# Patient Record
Sex: FEMALE | Race: WHITE | NOT HISPANIC OR LATINO | Employment: FULL TIME | URBAN - METROPOLITAN AREA
[De-identification: names, ages, dates, MRNs, and addresses within clinical notes are randomized per-mention and may not be internally consistent; named-entity substitution may affect disease eponyms.]

---

## 2017-11-08 ENCOUNTER — GENERIC CONVERSION - ENCOUNTER (OUTPATIENT)
Dept: OTHER | Facility: OTHER | Age: 22
End: 2017-11-08

## 2017-11-08 ENCOUNTER — ALLSCRIPTS OFFICE VISIT (OUTPATIENT)
Dept: OTHER | Facility: OTHER | Age: 22
End: 2017-11-08
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
## 2017-11-12 LAB
ADEQUACY: (HISTORICAL): NORMAL
CHLAMYDIA DFA, NAA OR PCR (HISTORICAL): NEGATIVE
CLINICIAN PROVIDIED ICD 9 OR 10 (HISTORICAL): NORMAL
COMMENT (HISTORICAL): NORMAL
DIAGNOSIS (HISTORICAL): NORMAL
GC, DNA PROB (HISTORICAL): NEGATIVE
PERFORMED BY (HISTORICAL): NORMAL
REFLEX (HISTORICAL): NORMAL
TEST INFORMATION (HISTORICAL): NORMAL

## 2017-11-13 ENCOUNTER — GENERIC CONVERSION - ENCOUNTER (OUTPATIENT)
Dept: OTHER | Facility: OTHER | Age: 22
End: 2017-11-13

## 2018-01-13 VITALS
BODY MASS INDEX: 19.68 KG/M2 | WEIGHT: 122.44 LBS | DIASTOLIC BLOOD PRESSURE: 68 MMHG | HEIGHT: 66 IN | SYSTOLIC BLOOD PRESSURE: 120 MMHG

## 2018-07-22 DIAGNOSIS — Z30.41 SURVEILLANCE FOR BIRTH CONTROL, ORAL CONTRACEPTIVES: Primary | ICD-10-CM

## 2019-01-03 ENCOUNTER — ANNUAL EXAM (OUTPATIENT)
Dept: OBGYN CLINIC | Facility: MEDICAL CENTER | Age: 24
End: 2019-01-03
Payer: COMMERCIAL

## 2019-01-03 VITALS
SYSTOLIC BLOOD PRESSURE: 110 MMHG | WEIGHT: 130 LBS | BODY MASS INDEX: 20.89 KG/M2 | DIASTOLIC BLOOD PRESSURE: 64 MMHG | HEIGHT: 66 IN

## 2019-01-03 DIAGNOSIS — Z01.419 ENCOUNTER FOR GYNECOLOGICAL EXAMINATION WITH PAPANICOLAOU SMEAR OF CERVIX: ICD-10-CM

## 2019-01-03 DIAGNOSIS — Z11.3 SCREENING EXAMINATION FOR STD (SEXUALLY TRANSMITTED DISEASE): ICD-10-CM

## 2019-01-03 DIAGNOSIS — Z30.011 ENCOUNTER FOR BCP (BIRTH CONTROL PILLS) INITIAL PRESCRIPTION: Primary | ICD-10-CM

## 2019-01-03 PROCEDURE — 87491 CHLMYD TRACH DNA AMP PROBE: CPT | Performed by: NURSE PRACTITIONER

## 2019-01-03 PROCEDURE — 87591 N.GONORRHOEAE DNA AMP PROB: CPT | Performed by: NURSE PRACTITIONER

## 2019-01-03 PROCEDURE — S0612 ANNUAL GYNECOLOGICAL EXAMINA: HCPCS | Performed by: NURSE PRACTITIONER

## 2019-01-03 PROCEDURE — G0124 SCREEN C/V THIN LAYER BY MD: HCPCS | Performed by: PATHOLOGY

## 2019-01-03 PROCEDURE — G0145 SCR C/V CYTO,THINLAYER,RESCR: HCPCS | Performed by: PATHOLOGY

## 2019-01-03 RX ORDER — NORETHINDRONE ACETATE AND ETHINYL ESTRADIOL 1; .02 MG/1; MG/1
1 TABLET ORAL DAILY
Qty: 84 TABLET | Refills: 3 | Status: SHIPPED | OUTPATIENT
Start: 2019-01-03 | End: 2021-01-11 | Stop reason: ALTCHOICE

## 2019-01-03 NOTE — PROGRESS NOTES
ASSESSMENT & PLAN: Becky Hein is a 21 y o  Arshad Saravia with normal gynecologic exam     1   Routine well woman exam done today  2  Pap and HPV:  The patient's last pap was   It was normal     Pap was done today  Current ASCCP Guidelines reviewed  3   STD testing  was done   4  Gardasil recommendations reviewed  is not fully  Vaccinated  Her mother does not want her to get the vaccine anymore  Pt aware she can get it up until age 32    11  The following were reviewed in today's visit: breast self exam, use and side effects of OCPs, adequate intake of calcium and vitamin D, exercise and healthy diet  6  rx for junel 1/20 sent to pharmacy for the year  CC:  Annual Gynecologic Examination    HPI: Becky Hein is a 21 y o  Arshad Saravia who presents for annual gynecologic examination  She has the following concerns:  Wants to change ocp, feels more cramping on sprintec and if doesn't take at exactly the same time, she gets bleeding  States mother recently diagnosed with cervical cancer  Health Maintenance:    She wears her seatbelt routinely  She does not perform regular monthly self breast exams  She feels safe at home  History reviewed  No pertinent past medical history  History reviewed  No pertinent surgical history  OB/Gyn History:    Pt does not have menstrual issues  History of sexually transmitted infection: No   History of abnormal pap smears: No      Patient is currently sexually active  The current method of family planning is OCP (estrogen/progesterone)  OB History      Para Term  AB Living    0 0 0 0 0 0    SAB TAB Ectopic Multiple Live Births    0 0 0 0 0          Family History   Problem Relation Age of Onset    Cervical cancer Mother        Social History:  Social History     Social History    Marital status: Single     Spouse name: N/A    Number of children: N/A    Years of education: N/A     Occupational History    Not on file       Social History Main Topics    Smoking status: Never Smoker    Smokeless tobacco: Never Used    Alcohol use Yes      Comment: social    Drug use: No    Sexual activity: Yes     Partners: Male     Birth control/ protection: Pill     Other Topics Concern    Not on file     Social History Narrative    No narrative on file     Patient is single  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 [place]  Boyfriend plays for the phant[employer]  No Known Allergies      Current Outpatient Prescriptions:     SPRINTEC 28 0 25-35 MG-MCG per tablet, TAKE ONE TABLET DAILY AS DIRECTED, Disp: 84 tablet, Rfl: 1    Review of Systems:  Constitutional :no fever, feels well, no tiredness, no recent weight gain or loss  ENT: no ear ache, no loss of hearing, no nosebleeds or nasal discharge, no sore throat or hoarseness  Cardiovascular: no complaints of slow or fast heart beat, no chest pain, no palpitations, no leg claudication or lower extremity edema  Respiratory: no complaints of shortness of shortness of breath, no TELLO  Breasts:no complaints of breast pain, breast lump, or nipple discharge  Gastrointestinal: no complaints of abdominal pain, constipation, nausea, vomiting, or diarrhea or bloody stools  Genitourinary : no complaints of dysuria, incontinence, pelvic pain, no dysmenorrhea, vaginal discharge or abnormal vaginal bleeding and as noted in HPI  Musculoskeletal: no complaints of arthralgia, no myalgia, no joint swelling or stiffness, no limb pain or swelling    Integumentary: no complaints of skin rash or lesion, itching or dry skin  Neurological: no complaints of headache, no confusion, no numbness or tingling, no dizziness or fainting    Objective      /64   Ht 5' 5 5" (1 664 m)   Wt 59 kg (130 lb)   LMP 12/09/2018   BMI 21 30 kg/m²     General:   appears stated age, cooperative, alert normal mood and affect   Neck: normal, supple,trachea midline, no masses   Heart: regular rate and rhythm, S1, S2 normal, no murmur, click, rub or gallop   Lungs: clear to auscultation bilaterally   Breasts: normal appearance, no masses or tenderness   Abdomen: soft, non-tender, without masses or organomegaly   Vulva: normal female genitalia   Vagina: normal vagina   Urethra: normal   Cervix: Normal, no discharge  Uterus: normal size, contour, position, consistency, mobility, non-tender   Adnexa: normal adnexa   Lymphatic palpation of lymph nodes in neck, axilla, groin and/or other locations: no lymphadenopathy or masses noted   Skin normal skin turgor and no rashes     Psychiatric orientation to person, place, and time: normal  mood and affect: normal

## 2019-01-04 LAB
C TRACH DNA SPEC QL NAA+PROBE: NEGATIVE
N GONORRHOEA DNA SPEC QL NAA+PROBE: NEGATIVE

## 2019-01-08 LAB
LAB AP GYN PRIMARY INTERPRETATION: NORMAL
LAB AP LMP: NORMAL
Lab: NORMAL
PATH INTERP SPEC-IMP: NORMAL

## 2019-01-10 DIAGNOSIS — Z30.41 SURVEILLANCE FOR BIRTH CONTROL, ORAL CONTRACEPTIVES: ICD-10-CM

## 2019-12-26 ENCOUNTER — OFFICE VISIT (OUTPATIENT)
Dept: URGENT CARE | Facility: MEDICAL CENTER | Age: 24
End: 2019-12-26
Payer: COMMERCIAL

## 2019-12-26 VITALS
DIASTOLIC BLOOD PRESSURE: 72 MMHG | TEMPERATURE: 100.3 F | RESPIRATION RATE: 18 BRPM | HEIGHT: 66 IN | HEART RATE: 96 BPM | SYSTOLIC BLOOD PRESSURE: 122 MMHG | WEIGHT: 137.13 LBS | BODY MASS INDEX: 22.04 KG/M2 | OXYGEN SATURATION: 98 %

## 2019-12-26 DIAGNOSIS — B96.89 ACUTE BACTERIAL SINUSITIS: Primary | ICD-10-CM

## 2019-12-26 DIAGNOSIS — J01.90 ACUTE BACTERIAL SINUSITIS: Primary | ICD-10-CM

## 2019-12-26 PROCEDURE — 99213 OFFICE O/P EST LOW 20 MIN: CPT | Performed by: PHYSICIAN ASSISTANT

## 2019-12-26 RX ORDER — BROMPHENIRAMINE MALEATE, PSEUDOEPHEDRINE HYDROCHLORIDE, AND DEXTROMETHORPHAN HYDROBROMIDE 2; 30; 10 MG/5ML; MG/5ML; MG/5ML
10 SYRUP ORAL 3 TIMES DAILY PRN
Qty: 120 ML | Refills: 0 | Status: SHIPPED | OUTPATIENT
Start: 2019-12-26 | End: 2020-01-06 | Stop reason: ALTCHOICE

## 2019-12-26 RX ORDER — AMOXICILLIN 875 MG/1
875 TABLET, COATED ORAL 2 TIMES DAILY
Qty: 20 TABLET | Refills: 0 | Status: SHIPPED | OUTPATIENT
Start: 2019-12-26 | End: 2020-01-05

## 2019-12-26 RX ORDER — FLUTICASONE PROPIONATE 50 MCG
2 SPRAY, SUSPENSION (ML) NASAL DAILY
Qty: 16 G | Refills: 0 | Status: SHIPPED | OUTPATIENT
Start: 2019-12-26 | End: 2020-01-06 | Stop reason: ALTCHOICE

## 2019-12-26 NOTE — PROGRESS NOTES
[facility]        NAME: Dana Negron is a 25 y o  female  : 1995    MRN: [id number]  DATE: 2019  TIME: 11:54 AM    Assessment and Plan   Acute bacterial sinusitis [J01 90, B96 89]  1  Acute bacterial sinusitis  amoxicillin (AMOXIL) 875 mg tablet    fluticasone (FLONASE) 50 mcg/act nasal spray    brompheniramine-pseudoephedrine-DM 30-2-10 MG/5ML syrup         Patient Instructions       Follow up with PCP in 3-5 days  Proceed to  ER if symptoms worsen  Chief Complaint     Chief Complaint   Patient presents with    Cold Like Symptoms     c/o sinus congestion, fever of 103 at home, body aches since Saturday night  [facility]ent to [facility] on , no rxs given  History of Present Illness       Patient had been trying symptomatic relief over the last 10 days without improvement  Sinusitis   This is a recurrent problem  The current episode started 1 to 4 weeks ago  The problem has been gradually worsening since onset  The maximum temperature recorded prior to her arrival was 100 4 - 100 9 F  The pain is mild  Associated symptoms include chills, congestion, coughing, ear pain, headaches, sinus pressure, sneezing and a sore throat  Pertinent negatives include no diaphoresis, hoarse voice, neck pain, shortness of breath or swollen glands  Past treatments include acetaminophen and oral decongestants  The treatment provided no relief  Review of Systems   Review of Systems   Constitutional: Positive for chills  Negative for diaphoresis  HENT: Positive for congestion, ear pain, sinus pressure, sneezing and sore throat  Negative for hoarse voice  Respiratory: Positive for cough  Negative for shortness of breath  Musculoskeletal: Negative for neck pain  Neurological: Positive for headaches  All other systems reviewed and are negative          Current Medications       Current Outpatient Medications:     amoxicillin (AMOXIL) 875 mg tablet, Take 1 tablet (875 mg total) by mouth 2 (two) times a day for 10 days, Disp: 20 tablet, Rfl: 0    brompheniramine-pseudoephedrine-DM 30-2-10 MG/5ML syrup, Take 10 mL by mouth 3 (three) times a day as needed for congestion, Disp: 120 mL, Rfl: 0    fluticasone (FLONASE) 50 mcg/act nasal spray, 2 sprays into each nostril daily, Disp: 16 g, Rfl: 0    norethindrone-ethinyl estradiol (MICROGESTIN 1/20) 1-20 MG-MCG per tablet, Take 1 tablet by mouth daily for 84 days, Disp: 84 tablet, Rfl: 3    SPRINTEC 28 0 25-35 MG-MCG per tablet, TAKE ONE TABLET DAILY AS DIRECTED (Patient not taking: Reported on 12/26/2019), Disp: 84 tablet, Rfl: 1    Current Allergies     Allergies as of 12/26/2019    (No Known Allergies)            The following portions of the patient's history were reviewed and updated as appropriate: allergies, current medications, past family history, past medical history, past social history, past surgical history and problem list      History reviewed  No pertinent past medical history  History reviewed  No pertinent surgical history  Family History   Problem Relation Age of Onset    Cervical cancer Mother          Medications have been verified  Objective   /72 (BP Location: Left arm, Patient Position: Sitting, Cuff Size: Adult)   Pulse 96   Temp 100 3 °F (37 9 °C) (Tympanic)   Resp 18   Ht 5' 6" (1 676 m)   Wt 62 2 kg (137 lb 2 oz)   LMP 12/21/2019   SpO2 98%   BMI 22 13 kg/m²        Physical Exam     Physical Exam   Constitutional: She appears well-developed and well-nourished  HENT:   Right Ear: Hearing, external ear and ear canal normal  Tympanic membrane is bulging  Left Ear: Hearing, external ear and ear canal normal  Tympanic membrane is bulging  Cardiovascular: Normal rate, regular rhythm and normal heart sounds  Pulmonary/Chest: Effort normal and breath sounds normal    Nursing note and vitals reviewed  Positive maxillary tenderness  Left greater than right    Poor transillumination on the left

## 2019-12-26 NOTE — PATIENT INSTRUCTIONS

## 2020-01-06 ENCOUNTER — ANNUAL EXAM (OUTPATIENT)
Dept: OBGYN CLINIC | Facility: MEDICAL CENTER | Age: 25
End: 2020-01-06
Payer: COMMERCIAL

## 2020-01-06 VITALS
BODY MASS INDEX: 22.34 KG/M2 | WEIGHT: 139 LBS | DIASTOLIC BLOOD PRESSURE: 74 MMHG | HEIGHT: 66 IN | SYSTOLIC BLOOD PRESSURE: 110 MMHG

## 2020-01-06 DIAGNOSIS — Z01.419 ENCOUNTER FOR GYNECOLOGICAL EXAMINATION WITH PAPANICOLAOU SMEAR OF CERVIX: Primary | ICD-10-CM

## 2020-01-06 PROCEDURE — S0612 ANNUAL GYNECOLOGICAL EXAMINA: HCPCS | Performed by: NURSE PRACTITIONER

## 2020-01-06 NOTE — PROGRESS NOTES
ASSESSMENT & PLAN: Elma Rosen is a 25 y o  Chiqui Chang with normal gynecologic exam     1   Routine well woman exam done today  2  Pap:  The patient's last pap was 2019  It was normal     Pap was done today  Current ASCCP Guidelines reviewed  3   STD testing  was not done   4  Gardasil recommendations reviewed  is not vaccinated  She declines  5  The following were reviewed in today's visit: breast self exam, family planning choices, adequate intake of calcium and vitamin D, exercise and healthy diet  6  Pt wants to continue with withdrawal as bcm, aware of ECP if needed  rto one year annual      CC:  Annual Gynecologic Examination    HPI: Elma Rosen is a 25 y o  Chiqui Chang who presents for annual gynecologic examination  She has the following concerns: none  Stopped ocp bc of migraines, feels much better off of it  Would be ok if conceived  Health Maintenance:    She wears her seatbelt routinely  She does perform regular monthly self breast exams  She feels safe at home  History reviewed  No pertinent past medical history  History reviewed  No pertinent surgical history  OB/Gyn History:    Pt does not have menstrual issues  History of sexually transmitted infection: No   History of abnormal pap smears: Yes   Patient is currently sexually active  The current method of family planning is coitus interruptus      OB History        0    Para   0    Term   0       0    AB   0    Living   0       SAB   0    TAB   0    Ectopic   0    Multiple   0    Live Births   0                 Family History   Problem Relation Age of Onset    Cervical cancer Mother        Social History:  Social History     Socioeconomic History    Marital status: Single     Spouse name: Not on file    Number of children: Not on file    Years of education: Not on file    Highest education level: Not on file   Occupational History    Not on file   Social Needs    Financial resource strain: Not on file    Food insecurity:     Worry: Not on file     Inability: Not on file    Transportation needs:     Medical: Not on file     Non-medical: Not on file   Tobacco Use    Smoking status: Never Smoker    Smokeless tobacco: Never Used   Substance and Sexual Activity    Alcohol use: Yes     Frequency: Monthly or less     Comment: social    Drug use: No    Sexual activity: Yes     Partners: Male   Lifestyle    Physical activity:     Days per week: Not on file     Minutes per session: Not on file    Stress: Not on file   Relationships    Social connections:     Talks on phone: Not on file     Gets together: Not on file     Attends [employer]ism service: Not on file     Active member of club or organization: Not on file     Attends meetings of clubs or organizations: Not on file     Relationship status: Not on file    Intimate partner violence:     Fear of current or ex partner: Not on file     Emotionally abused: Not on file     Physically abused: Not on file     Forced sexual activity: Not on file   Other Topics Concern    Not on file   Social History Narrative    Not on file     Patient is single  Patient is currently employed     No Known Allergies      Current Outpatient Medications:     norethindrone-ethinyl estradiol (MICROGESTIN 1/20) 1-20 MG-MCG per tablet, Take 1 tablet by mouth daily for 84 days, Disp: 84 tablet, Rfl: 3    Review of Systems:  Constitutional :no fever, feels well, no tiredness, no recent weight gain or loss  ENT: no ear ache, no loss of hearing, no nosebleeds or nasal discharge, no sore throat or hoarseness  Cardiovascular: no complaints of slow or fast heart beat, no chest pain, no palpitations, no leg claudication or lower extremity edema    Respiratory: no complaints of shortness of shortness of breath, no TELLO  Breasts:no complaints of breast pain, breast lump, or nipple discharge  Gastrointestinal: no complaints of abdominal pain, constipation, nausea, vomiting, or diarrhea or bloody stools  Genitourinary : no complaints of dysuria, incontinence, pelvic pain, no dysmenorrhea, vaginal discharge or abnormal vaginal bleeding and as noted in HPI  Musculoskeletal: no complaints of arthralgia, no myalgia, no joint swelling or stiffness, no limb pain or swelling  Integumentary: no complaints of skin rash or lesion, itching or dry skin  Neurological: no complaints of headache, no confusion, no numbness or tingling, no dizziness or fainting    Objective      /74   Ht 5' 6" (1 676 m)   Wt 63 kg (139 lb)   LMP 12/18/2019   BMI 22 44 kg/m²     General:   appears stated age, cooperative, alert normal mood and affect   Neck: normal, supple,trachea midline, no masses   Heart: regular rate and rhythm, S1, S2 normal, no murmur, click, rub or gallop   Lungs: clear to auscultation bilaterally   Breasts: normal appearance, no masses or tenderness   Abdomen: soft, non-tender, without masses or organomegaly   Vulva: normal   Vagina: normal vagina   Urethra: normal   Cervix: Normal, no discharge  Uterus: normal size, contour, position, consistency, mobility, non-tender   Adnexa: no mass, fullness, tenderness   Lymphatic palpation of lymph nodes in neck, axilla, groin and/or other locations: no lymphadenopathy or masses noted   Skin normal skin turgor and no rashes     Psychiatric orientation to person, place, and time: normal  mood and affect: normal

## 2020-01-09 LAB
CLINICAL INFO: ABNORMAL
CYTOLOGY CMNT CVX/VAG CYTO-IMP: ABNORMAL
DATE PREVIOUS BX: ABNORMAL
GEN CATEG CVX/VAG CYTO-IMP: ABNORMAL
HPV E6+E7 MRNA CVX QL NAA+PROBE: DETECTED
LMP START DATE: ABNORMAL
SL AMB PREV. PAP:: ABNORMAL
SPECIMEN SOURCE CVX/VAG CYTO: ABNORMAL

## 2020-01-13 ENCOUNTER — TELEPHONE (OUTPATIENT)
Dept: OBGYN CLINIC | Facility: MEDICAL CENTER | Age: 25
End: 2020-01-13

## 2020-01-13 NOTE — RESULT ENCOUNTER NOTE
Please call the patient regarding abnormal result      Has AGC pap with + hpv,           Needs colpo

## 2020-02-12 ENCOUNTER — PROCEDURE VISIT (OUTPATIENT)
Dept: OBGYN CLINIC | Facility: MEDICAL CENTER | Age: 25
End: 2020-02-12
Payer: COMMERCIAL

## 2020-02-12 VITALS — SYSTOLIC BLOOD PRESSURE: 120 MMHG | BODY MASS INDEX: 22.73 KG/M2 | WEIGHT: 140.8 LBS | DIASTOLIC BLOOD PRESSURE: 70 MMHG

## 2020-02-12 DIAGNOSIS — R87.610 ASCUS WITH POSITIVE HIGH RISK HPV CERVICAL: Primary | ICD-10-CM

## 2020-02-12 DIAGNOSIS — R87.810 ASCUS WITH POSITIVE HIGH RISK HPV CERVICAL: Primary | ICD-10-CM

## 2020-02-12 PROCEDURE — 57455 BIOPSY OF CERVIX W/SCOPE: CPT | Performed by: OBSTETRICS & GYNECOLOGY

## 2020-02-14 LAB — SPECIMEN SOURCE: NORMAL

## 2020-11-24 ENCOUNTER — NURSE TRIAGE (OUTPATIENT)
Dept: OTHER | Facility: OTHER | Age: 25
End: 2020-11-24

## 2020-11-24 DIAGNOSIS — Z20.828 EXPOSURE TO SARS-ASSOCIATED CORONAVIRUS: Primary | ICD-10-CM

## 2020-11-25 DIAGNOSIS — Z20.828 EXPOSURE TO SARS-ASSOCIATED CORONAVIRUS: ICD-10-CM

## 2020-11-25 PROCEDURE — U0003 INFECTIOUS AGENT DETECTION BY NUCLEIC ACID (DNA OR RNA); SEVERE ACUTE RESPIRATORY SYNDROME CORONAVIRUS 2 (SARS-COV-2) (CORONAVIRUS DISEASE [COVID-19]), AMPLIFIED PROBE TECHNIQUE, MAKING USE OF HIGH THROUGHPUT TECHNOLOGIES AS DESCRIBED BY CMS-2020-01-R: HCPCS | Performed by: FAMILY MEDICINE

## 2020-11-27 LAB — SARS-COV-2 RNA SPEC QL NAA+PROBE: DETECTED

## 2021-01-11 ENCOUNTER — ANNUAL EXAM (OUTPATIENT)
Dept: OBGYN CLINIC | Facility: MEDICAL CENTER | Age: 26
End: 2021-01-11
Payer: COMMERCIAL

## 2021-01-11 VITALS
BODY MASS INDEX: 21.69 KG/M2 | WEIGHT: 135 LBS | HEIGHT: 66 IN | SYSTOLIC BLOOD PRESSURE: 120 MMHG | DIASTOLIC BLOOD PRESSURE: 70 MMHG

## 2021-01-11 DIAGNOSIS — Z01.419 ENCOUNTER FOR GYNECOLOGICAL EXAMINATION WITH PAPANICOLAOU SMEAR OF CERVIX: Primary | ICD-10-CM

## 2021-01-11 PROCEDURE — 87624 HPV HI-RISK TYP POOLED RSLT: CPT | Performed by: NURSE PRACTITIONER

## 2021-01-11 PROCEDURE — S0612 ANNUAL GYNECOLOGICAL EXAMINA: HCPCS | Performed by: NURSE PRACTITIONER

## 2021-01-11 PROCEDURE — G0145 SCR C/V CYTO,THINLAYER,RESCR: HCPCS | Performed by: NURSE PRACTITIONER

## 2021-01-11 NOTE — PROGRESS NOTES
ASSESSMENT & PLAN: Elma Rosen is a 22 y o  Chiqui Chang with normal gynecologic exam     1   Routine well woman exam done today  2  Pap:  The patient's last pap was 2019  It was abnormal     Pap was done today  Current ASCCP Guidelines reviewed  3   STD testing  was not done   4  Gardasil recommendations reviewed   5  The following were reviewed in today's visit: breast self exam, family planning choices, adequate intake of calcium and vitamin D, exercise and healthy diet  6  rto yearly gyn exam     CC:  Annual Gynecologic Examination    HPI: Elma Rosen is a 22 y o  Chiqui Chang who presents for annual gynecologic examination  She has the following concerns: none    Health Maintenance:    She wears her seatbelt routinely  She does perform regular monthly self breast exams  She feels safe at home  History reviewed  No pertinent past medical history  History reviewed  No pertinent surgical history  OB/Gyn History:    Pt does not have menstrual issues  History of sexually transmitted infection: No   History of abnormal pap smears:yes  Patient is currently sexually active      The current method of family planning is condoms       OB History        0    Para   0    Term   0       0    AB   0    Living   0       SAB   0    TAB   0    Ectopic   0    Multiple   0    Live Births   0                 Family History   Problem Relation Age of Onset    Cervical cancer Mother        Social History:  Social History     Socioeconomic History    Marital status: Single     Spouse name: Not on file    Number of children: Not on file    Years of education: Not on file    Highest education level: Not on file   Occupational History    Not on file   Social Needs    Financial resource strain: Not on file    Food insecurity     Worry: Not on file     Inability: Not on file    Transportation needs     Medical: Not on file     Non-medical: Not on file   Tobacco Use    Smoking status: Never Smoker    Smokeless tobacco: Never Used   Substance and Sexual Activity    Alcohol use: Yes     Frequency: Monthly or less     Comment: social    Drug use: No    Sexual activity: Yes     Partners: Male     Birth control/protection: None   Lifestyle    Physical activity     Days per week: Not on file     Minutes per session: Not on file    Stress: Not on file   Relationships    Social connections     Talks on phone: Not on file     Gets together: Not on file     Attends Hinduism service: Not on file     Active member of club or organization: Not on file     Attends meetings of clubs or organizations: Not on file     Relationship status: Not on file    Intimate partner violence     Fear of current or ex partner: Not on file     Emotionally abused: Not on file     Physically abused: Not on file     Forced sexual activity: Not on file   Other Topics Concern    Not on file   Social History Narrative    Not on file     Patient is single  Patient is currently employed as a kindergarden teacher  No Known Allergies    No current outpatient medications on file  Review of Systems:  Constitutional :no fever, feels well, no tiredness, no recent weight gain or loss  ENT: no ear ache, no loss of hearing, no nosebleeds or nasal discharge, no sore throat or hoarseness  Cardiovascular: no complaints of slow or fast heart beat, no chest pain, no palpitations, no leg claudication or lower extremity edema  Respiratory: no complaints of shortness of shortness of breath, no TELLO  Breasts:no complaints of breast pain, breast lump, or nipple discharge  Gastrointestinal: no complaints of abdominal pain, constipation, nausea, vomiting, or diarrhea or bloody stools  Genitourinary : no complaints of dysuria, incontinence, pelvic pain, no dysmenorrhea, vaginal discharge or abnormal vaginal bleeding and as noted in HPI    Musculoskeletal: no complaints of arthralgia, no myalgia, no joint swelling or stiffness, no limb pain or swelling  Integumentary: no complaints of skin rash or lesion, itching or dry skin  Neurological: no complaints of headache, no confusion, no numbness or tingling, no dizziness or fainting    Objective      /70   Ht 5' 6" (1 676 m)   Wt 61 2 kg (135 lb)   LMP 12/14/2020   BMI 21 79 kg/m²     General:   appears stated age, cooperative, alert normal mood and affect   Neck: normal, supple,trachea midline, no masses   Heart: regular rate and rhythm, S1, S2 normal, no murmur, click, rub or gallop   Lungs: clear to auscultation bilaterally   Breasts: normal appearance, no masses or tenderness   Abdomen: soft, non-tender, without masses or organomegaly   Vulva: normal   Vagina: normal vagina   Urethra: normal   Cervix: Normal, no discharge  Uterus: normal size, contour, position, consistency, mobility, non-tender   Adnexa: no mass, fullness, tenderness   Lymphatic palpation of lymph nodes in neck, axilla, groin and/or other locations: no lymphadenopathy or masses noted   Skin normal skin turgor and no rashes     Psychiatric orientation to person, place, and time: normal  mood and affect: normal

## 2021-01-13 LAB
LAB AP GYN PRIMARY INTERPRETATION: NORMAL
LAB AP LMP: NORMAL
Lab: NORMAL

## 2021-01-16 LAB
HPV HR 12 DNA CVX QL NAA+PROBE: POSITIVE
HPV16 DNA CVX QL NAA+PROBE: NEGATIVE
HPV18 DNA CVX QL NAA+PROBE: NEGATIVE

## 2022-01-24 ENCOUNTER — ANNUAL EXAM (OUTPATIENT)
Dept: OBGYN CLINIC | Facility: MEDICAL CENTER | Age: 27
End: 2022-01-24
Payer: COMMERCIAL

## 2022-01-24 VITALS
DIASTOLIC BLOOD PRESSURE: 80 MMHG | SYSTOLIC BLOOD PRESSURE: 120 MMHG | HEIGHT: 66 IN | WEIGHT: 135 LBS | BODY MASS INDEX: 21.69 KG/M2

## 2022-01-24 DIAGNOSIS — R87.810 ASCUS WITH POSITIVE HIGH RISK HPV CERVICAL: ICD-10-CM

## 2022-01-24 DIAGNOSIS — Z01.419 WOMEN'S ANNUAL ROUTINE GYNECOLOGICAL EXAMINATION: Primary | ICD-10-CM

## 2022-01-24 DIAGNOSIS — R87.610 ASCUS WITH POSITIVE HIGH RISK HPV CERVICAL: ICD-10-CM

## 2022-01-24 PROCEDURE — G0145 SCR C/V CYTO,THINLAYER,RESCR: HCPCS | Performed by: STUDENT IN AN ORGANIZED HEALTH CARE EDUCATION/TRAINING PROGRAM

## 2022-01-24 PROCEDURE — S0612 ANNUAL GYNECOLOGICAL EXAMINA: HCPCS | Performed by: STUDENT IN AN ORGANIZED HEALTH CARE EDUCATION/TRAINING PROGRAM

## 2022-01-24 NOTE — PROGRESS NOTES
[facility] [address] [place] [address]    ASSESSMENT/PLAN: Leopold Care is a 32 y o  Dellis Loft who presents for annual gynecologic exam     Encounter for routine gynecologic examination  - Routine well woman exam completed today  - Cervical Cancer Screening: Current ASCCP Guidelines reviewed  HPV OHR pos last year  01/24/2022   - HPV Vaccination status: Immunization series complete  - STI screening offered including HIV testing: Declines  - Contraceptive counseling discussed  Declines  Additional problems addressed during this visit:  1  Women's annual routine gynecological examination  -     Liquid-based pap, screening    2  ASCUS with positive high risk HPV cervical  -     Liquid-based pap, screening        CC: Annual Gynecologic Examination    HPI: Leopold Care is a 32 y o  Dellis Loft who presents for annual gynecologic examination  She reports no new changes to her health  She reports no breast concerns  She gets irregular periods every 28-35 days  She has no vaginal discharge, vulvar or vaginal lesions, pelvic pain, or abnormal bleeding  She has no sexual health concerns and is currently sexually active with one male partner  She contracepts with withdrawal  She has no symptoms of pelvic organ prolapse, urinary, or fecal incontinence  She denies intimate partner violence  The following portions of the patient's history were reviewed and updated as appropriate: allergies, current medications, past family history, past medical history, obstetric history, gynecologic history, past social history, past surgical history and problem list     Review of Systems   Constitutional: Negative for chills and fever  Respiratory: Negative for cough and shortness of breath  Cardiovascular: Negative for chest pain and leg swelling  Gastrointestinal: Negative for abdominal pain, nausea and vomiting  Genitourinary: Negative for dysuria, frequency and urgency     Neurological: Negative for dizziness, light-headedness and headaches  Objective:  /80   Ht 5' 6" (1 676 m)   Wt 61 2 kg (135 lb)   LMP 12/27/2021   BMI 21 79 kg/m²    Physical Exam  Exam conducted with a chaperone present  Constitutional:       Appearance: Normal appearance  HENT:      Head: Normocephalic and atraumatic  Cardiovascular:      Rate and Rhythm: Normal rate  Pulmonary:      Effort: Pulmonary effort is normal    Chest:   Breasts: Breasts are symmetrical       Right: Normal  No swelling, bleeding, inverted nipple, mass, nipple discharge, skin change, tenderness or axillary adenopathy  Left: Normal  No swelling, bleeding, inverted nipple, mass, nipple discharge, skin change, tenderness or axillary adenopathy  Abdominal:      General: There is no distension  Tenderness: There is no abdominal tenderness  There is no guarding  Genitourinary:     Exam position: Lithotomy position  Pubic Area: No rash  Labia:         Right: No rash, tenderness or lesion  Left: No rash, tenderness or lesion  Urethra: No prolapse, urethral swelling or urethral lesion  Vagina: Normal  No vaginal discharge, erythema, tenderness, bleeding or lesions  Cervix: No cervical motion tenderness, discharge, friability or erythema  Uterus: Not enlarged, not tender and no uterine prolapse  Adnexa:         Right: No mass, tenderness or fullness  Left: No mass, tenderness or fullness  Lymphadenopathy:      Upper Body:      Right upper body: No axillary adenopathy  Left upper body: No axillary adenopathy  Lower Body: No right inguinal adenopathy  No left inguinal adenopathy  Neurological:      Mental Status: She is alert             Susannah Ward MD  [address] [place] [address]  1/24/2022 6:43 PM

## 2022-01-29 LAB
LAB AP GYN PRIMARY INTERPRETATION: NORMAL
Lab: NORMAL

## 2023-02-16 ENCOUNTER — OFFICE VISIT (OUTPATIENT)
Dept: GASTROENTEROLOGY | Facility: MEDICAL CENTER | Age: 28
End: 2023-02-16

## 2023-02-16 VITALS
HEART RATE: 76 BPM | SYSTOLIC BLOOD PRESSURE: 126 MMHG | BODY MASS INDEX: 21.31 KG/M2 | TEMPERATURE: 97.6 F | WEIGHT: 132 LBS | DIASTOLIC BLOOD PRESSURE: 72 MMHG

## 2023-02-16 DIAGNOSIS — N93.9 VAGINAL BLEEDING: ICD-10-CM

## 2023-02-16 DIAGNOSIS — R14.0 BLOATING: ICD-10-CM

## 2023-02-16 DIAGNOSIS — R19.7 DIARRHEA, UNSPECIFIED TYPE: ICD-10-CM

## 2023-02-16 DIAGNOSIS — Z83.79 FAMILY HISTORY OF CROHN'S DISEASE: ICD-10-CM

## 2023-02-16 DIAGNOSIS — R19.4 FREQUENT BOWEL MOVEMENTS: ICD-10-CM

## 2023-02-16 DIAGNOSIS — K62.5 RECTAL BLEEDING: ICD-10-CM

## 2023-02-16 DIAGNOSIS — R10.30 LOWER ABDOMINAL PAIN: Primary | ICD-10-CM

## 2023-02-16 RX ORDER — DICYCLOMINE HYDROCHLORIDE 10 MG/1
10 CAPSULE ORAL 4 TIMES DAILY PRN
Qty: 120 CAPSULE | Refills: 11 | Status: SHIPPED | OUTPATIENT
Start: 2023-02-16

## 2023-02-16 NOTE — PROGRESS NOTES
[facility] - Outpatient Consultation  Sam Cavazos 32 y o  female MRN: [id number]  Encounter: [id number]          ASSESSMENT AND PLAN:    31 yo healthy female here for 5 months of increased bowel movements, diarrhea, abdominal   Reports nocturnal BM and rectal bleeding concerning for IBD  Will check for Celiac, hyperthyroid and IBD  Will try bentyl for possible IBS-D component  1  Lower abdominal pain  2  Diarrhea, unspecified type  3  Frequent bowel movements  4  Rectal bleeding  - IgA; Future  - Tissue transglutaminase, IgA; Future  - Calprotectin,Fecal; Future  - TSH, 3rd generation; Future  - Colonoscopy; Future  - dicyclomine (BENTYL) 10 mg capsule; Take 1 capsule (10 mg total) by mouth 4 (four) times a day as needed (abdominal pain and diarrhea)  Dispense: 120 capsule; Refill: 11    5  Bloating  If TTG positive or calprotectin elevated, may need EGD at time of colonoscopy  - IgA; Future  - Tissue transglutaminase, IgA; Future    6  Vaginal bleeding  Has follow up scheduled with GYN    7  Family history of Crohn's disease  Maternal grandfather  Workup as above    ______________________________________________________________________    HPI:    Started about 5 months ago  Initially was associated with period  Getting lower abdominal pain  Having 4-5 BM a day  +bloating and gas  Wakes up in middle of night with bloating and to have BM  Over past 2-3 months has been having bleeding from both rectum and vagina  Scheduled to see gyn  Alternating stool consistency with diarrhea and constipation  No heartburn, nausea, or vomiting  Has tried to cut back on dairy, gluten, coffee and no effect  Weight has been fluctuating about 10#  Maternal grandfather with Crohn's  No other IBD or GI malignancy  REVIEW OF SYSTEMS:    CONSTITUTIONAL: Denies any fever, chills, rigors, and weight loss  HEENT: No earache or tinnitus   Denies hearing loss or visual disturbances  CARDIOVASCULAR: No chest pain or palpitations  RESPIRATORY: Denies any cough, hemoptysis, shortness of breath or dyspnea on exertion  GASTROINTESTINAL: As noted in the History of Present Illness  GENITOURINARY: No problems with urination  Denies any hematuria or dysuria  NEUROLOGIC: No dizziness or vertigo, denies headaches  MUSCULOSKELETAL: Denies any muscle or joint pain  SKIN: Denies skin rashes or itching  ENDOCRINE: Denies excessive thirst  Denies intolerance to heat or cold  PSYCHOSOCIAL: Denies depression or anxiety  Denies any recent memory loss  Historical Information   History reviewed  No pertinent past medical history  History reviewed  No pertinent surgical history  Social History   Social History     Substance and Sexual Activity   Alcohol Use Yes    Comment: social     Social History     Substance and Sexual Activity   Drug Use No     Social History     Tobacco Use   Smoking Status Never   Smokeless Tobacco Never     Family History   Problem Relation Age of Onset   • Cervical cancer Mother        Meds/Allergies     No current outpatient medications on file  No Known Allergies        Objective     Blood pressure 126/72, pulse 76, temperature 97 6 °F (36 4 °C), weight 59 9 kg (132 lb)  Body mass index is 21 31 kg/m²  PHYSICAL EXAM:      General Appearance:   Alert, cooperative, no distress   HEENT:   Normocephalic, atraumatic, anicteric  Neck:  Supple, symmetrical, trachea midline   Lungs:   Clear to auscultation bilaterally; no rales, rhonchi or wheezing; respirations unlabored    Heart[de-identified]   Regular rate and rhythm; no murmur, rub, or gallop     Abdomen:   Soft, mild lower abdominal TTP, non-distended; normal bowel sounds; no masses, no organomegaly    Genitalia:   Deferred    Rectal:   Deferred    Extremities:  No cyanosis, clubbing or edema    Pulses:  2+ and symmetric    Skin:  No jaundice, rashes, or lesions    Lymph nodes:  No palpable cervical lymphadenopathy        Lab Results:   No visits with results within 1 Day(s) from this visit  Latest known visit with results is:   Annual Exam on 01/24/2022   Component Date Value   • Case Report 01/24/2022                      Value:Gynecologic Cytology Report                       Case: [id number]                                  Authorizing Provider:  Ashlie Olivas MD    Collected:           01/24/2022 1826              Ordering Location:     [facility]  Received:            01/24/2022 1826                                     [facility]                                                           First Screen:          ELI Mario                                                         Specimen:    LIQUID-BASED PAP, SCREENING, Cervix                                                       • Primary Interpretation 01/24/2022 Negative for intraepithelial lesion or malignancy    • Specimen Adequacy 01/24/2022 Satisfactory for evaluation  Endocervical/transformation zone component present  • Additional Information 01/24/2022                      Value: This result contains rich text formatting which cannot be displayed here  Radiology Results:   No results found

## 2023-03-09 ENCOUNTER — ANNUAL EXAM (OUTPATIENT)
Dept: OBGYN CLINIC | Facility: MEDICAL CENTER | Age: 28
End: 2023-03-09

## 2023-03-09 VITALS
DIASTOLIC BLOOD PRESSURE: 72 MMHG | BODY MASS INDEX: 21.92 KG/M2 | WEIGHT: 136.4 LBS | HEIGHT: 66 IN | SYSTOLIC BLOOD PRESSURE: 112 MMHG

## 2023-03-09 DIAGNOSIS — Z01.419 ENCOUNTER FOR GYNECOLOGICAL EXAMINATION: Primary | ICD-10-CM

## 2023-03-09 NOTE — PROGRESS NOTES
[facility] [address] [place] [address]    ASSESSMENT/PLAN: Percy Carbajal is a 29 y o  Jaye Favor who presents for annual gynecologic exam     Encounter for routine gynecologic examination  - Routine well woman exam completed today  - Cervical Cancer Screening: Current ASCCP Guidelines reviewed  Last Pap: 01/24/2022 NILM  H/o ASCUS HPV OHR pos  Repeat co-testing in 2025  - HPV Vaccination status: Immunization series complete  - STI screening offered including HIV testing: Declines  - Contraceptive counseling discussed  Current contraception: barrier  Additional problems addressed during this visit:  1  Encounter for gynecological examination        CC:  Annual Gynecologic Examination    HPI: Percy Carbajal is a 29 y o  Jaye Favor who presents for annual gynecologic examination  She reports  no new changes to her health  She reports no breast concerns  She gets regular periods  She has no vaginal discharge, vulvar or vaginal lesions, pelvic pain, or abnormal bleeding  She has no sexual health concerns and is currently sexually active with one male partner  She contracepts with barrier method  She has no symptoms of pelvic organ prolapse, urinary, or fecal incontinence  She denies intimate partner violence  The following portions of the patient's history were reviewed and updated as appropriate: allergies, current medications, past family history, past medical history, obstetric history, gynecologic history, past social history, past surgical history and problem list     Review of Systems   Constitutional: Negative for chills and fever  Respiratory: Negative for cough and shortness of breath  Cardiovascular: Negative for chest pain and leg swelling  Gastrointestinal: Negative for abdominal pain, nausea and vomiting  Genitourinary: Negative for dysuria, frequency and urgency  Neurological: Negative for dizziness, light-headedness and headaches  Objective:  /72   Ht 5' 6" (1 676 m)   Wt 61 9 kg (136 lb 6 4 oz)   LMP 02/28/2023 (Exact Date)   BMI 22 02 kg/m²    Physical Exam  Constitutional:       Appearance: Normal appearance  HENT:      Head: Normocephalic and atraumatic  Cardiovascular:      Rate and Rhythm: Normal rate  Pulmonary:      Effort: Pulmonary effort is normal    Chest:   Breasts:     Breasts are symmetrical       Right: Normal  No swelling, bleeding, inverted nipple, mass, nipple discharge, skin change or tenderness  Left: Normal  No swelling, bleeding, inverted nipple, mass, nipple discharge, skin change or tenderness  Abdominal:      General: There is no distension  Tenderness: There is no abdominal tenderness  There is no guarding  Lymphadenopathy:      Upper Body:      Right upper body: No axillary adenopathy  Left upper body: No axillary adenopathy  Neurological:      Mental Status: She is alert             Estiven Wills MD  [address] [place] [address]  3/9/2023 7:23 AM

## 2023-04-30 PROBLEM — Z01.419 ENCOUNTER FOR GYNECOLOGICAL EXAMINATION WITH PAPANICOLAOU SMEAR OF CERVIX: Status: RESOLVED | Noted: 2019-01-03 | Resolved: 2023-04-30

## 2023-04-30 NOTE — PROGRESS NOTES
Assessment/Plan:      Diagnoses and all orders for this visit:    Abnormal uterine bleeding (AUB)  -     VAGINOSIS DNA PROBE (AFFIRM)  -     US pelvis complete w transvaginal; Future  -     POCT urine dip  -     POCT wet mount    Pelvic pain  -     VAGINOSIS DNA PROBE (AFFIRM)  -     US pelvis complete w transvaginal; Future  -     POCT urine dip  -     POCT wet mount    Bloating  -     VAGINOSIS DNA PROBE (AFFIRM)  -     US pelvis complete w transvaginal; Future  -     POCT urine dip  -     POCT wet mount      -UPT in office today- WNL  Wet mount/ KOH - negative for infection   -Affirm collected we will treat based on results  -Reviewed recommendation for pelvic ultrasound, ordered at today's visit  -Patient encouraged to keep follow-up with GI as scheduled  -Signs and symptoms to report reviewed  -We will call/Broadcastrt message with results    RTO 3/2024 for annual exam or sooner as needed     Subjective:     Patient ID: Arik Bloom is a 29 y o  female  HPI G0 here with complaints of bloating, urinary urgency and irregular bleeding  Bloating, urgency and low back pain for the past 6 months  LMP 4/11/23, 17 days late  Over the past year menses have been pretty regular monthly lasting 5 to 6 days  Had a normal menstrual cycle and February  Was then about 17 days late  Patient states she had a lot of cramping prior to menses  Does not typically have that amount of cramping  Bleeding was also a little heavier than normal   Associated symptoms include low back pain, abdominal/pelvic bloating, intermittent urinary urgency and brown discharge  Denies alleviating or aggravating factors  Has used ibuprofen for cramping with some relief  Bowel movements or intercourse does not increase low back, pelvic pain or bloating  Is sexually active not using contraception  Denies new medications, vitamins or supplements; nipple discharge,  visual changes or increase in headaches    Admits to an increase in stress "recently  Denies new partner, fever, chills, urinary frequency and CVA tenderness  Denies concern for STIs    Last Pap smear 1/24/22 NILM  Pleated Gardasil vaccine series    Review of Systems   Constitutional: Negative for chills, fatigue and fever  Respiratory: Negative  Cardiovascular: Negative  Gastrointestinal: Positive for abdominal distention  Genitourinary: Positive for menstrual problem, pelvic pain, urgency, vaginal bleeding and vaginal discharge  Negative for decreased urine volume, difficulty urinating, dyspareunia, dysuria, enuresis, flank pain, frequency, genital sores, hematuria and vaginal pain  Neurological: Negative for headaches  Objective:  /70   Ht 5' 6\" (1 676 m)   Wt 62 1 kg (137 lb)   LMP 04/11/2023 (Exact Date)   BMI 22 11 kg/m²      Physical Exam  Vitals reviewed  Exam conducted with a chaperone present  Constitutional:       Appearance: Normal appearance  Genitourinary:     General: Normal vulva  Exam position: Lithotomy position  Labia:         Right: No rash, tenderness, lesion or injury  Left: No rash, tenderness, lesion or injury  Vagina: Normal       Cervix: Normal       Uterus: Normal  Tender  Adnexa:         Right: Tenderness present  Left: Tenderness present  Comments: Small amount of thin white vaginal discharge noted on exam   Tenderness with bimanual exam  Neurological:      Mental Status: She is alert and oriented to person, place, and time     Psychiatric:         Mood and Affect: Mood normal          Behavior: Behavior normal          "

## 2023-05-02 ENCOUNTER — OFFICE VISIT (OUTPATIENT)
Dept: OBGYN CLINIC | Facility: CLINIC | Age: 28
End: 2023-05-02

## 2023-05-02 VITALS
BODY MASS INDEX: 22.02 KG/M2 | SYSTOLIC BLOOD PRESSURE: 134 MMHG | HEIGHT: 66 IN | DIASTOLIC BLOOD PRESSURE: 70 MMHG | WEIGHT: 137 LBS

## 2023-05-02 DIAGNOSIS — N93.9 ABNORMAL UTERINE BLEEDING (AUB): Primary | ICD-10-CM

## 2023-05-02 DIAGNOSIS — R10.2 PELVIC PAIN: ICD-10-CM

## 2023-05-02 DIAGNOSIS — R14.0 BLOATING: ICD-10-CM

## 2023-05-02 LAB
BV WHIFF TEST VAG QL: NORMAL
CLUE CELLS SPEC QL WET PREP: NORMAL
PH SMN: 4.5 [PH]
SL AMB  POCT GLUCOSE, UA: NORMAL
SL AMB LEUKOCYTE ESTERASE,UA: NORMAL
SL AMB POCT BILIRUBIN,UA: NORMAL
SL AMB POCT BLOOD,UA: NORMAL
SL AMB POCT CLARITY,UA: NORMAL
SL AMB POCT COLOR,UA: YELLOW
SL AMB POCT KETONES,UA: NORMAL
SL AMB POCT NITRITE,UA: NORMAL
SL AMB POCT PH,UA: 6
SL AMB POCT SPECIFIC GRAVITY,UA: 1.01
SL AMB POCT URINE PROTEIN: NORMAL
SL AMB POCT UROBILINOGEN: 0.2
SL AMB POCT WET MOUNT: NORMAL
T VAGINALIS VAG QL WET PREP: NORMAL
YEAST VAG QL WET PREP: NORMAL

## 2023-05-04 LAB
CANDIDA RRNA VAG QL PROBE: NEGATIVE
G VAGINALIS RRNA GENITAL QL PROBE: NEGATIVE
T VAGINALIS RRNA GENITAL QL PROBE: NEGATIVE

## 2023-05-12 ENCOUNTER — HOSPITAL ENCOUNTER (OUTPATIENT)
Dept: ULTRASOUND IMAGING | Facility: MEDICAL CENTER | Age: 28
Discharge: HOME | End: 2023-05-12

## 2023-05-12 DIAGNOSIS — N93.9 ABNORMAL UTERINE BLEEDING (AUB): ICD-10-CM

## 2023-05-12 DIAGNOSIS — R10.2 PELVIC PAIN: ICD-10-CM

## 2023-05-12 DIAGNOSIS — R14.0 BLOATING: ICD-10-CM

## 2023-05-19 DIAGNOSIS — N92.6 IRREGULAR MENSES: Primary | ICD-10-CM

## 2023-09-28 DIAGNOSIS — N93.9 ABNORMAL UTERINE BLEEDING (AUB): Primary | ICD-10-CM

## 2023-10-19 ENCOUNTER — HOSPITAL ENCOUNTER (OUTPATIENT)
Dept: ULTRASOUND IMAGING | Facility: MEDICAL CENTER | Age: 28
Discharge: HOME | End: 2023-10-19
Payer: COMMERCIAL

## 2023-10-19 DIAGNOSIS — N93.9 ABNORMAL UTERINE BLEEDING (AUB): ICD-10-CM

## 2023-10-19 PROCEDURE — 76830 TRANSVAGINAL US NON-OB: CPT

## 2023-10-19 PROCEDURE — 76856 US EXAM PELVIC COMPLETE: CPT
